# Patient Record
Sex: FEMALE | Race: WHITE
[De-identification: names, ages, dates, MRNs, and addresses within clinical notes are randomized per-mention and may not be internally consistent; named-entity substitution may affect disease eponyms.]

---

## 2019-07-30 ENCOUNTER — HOSPITAL ENCOUNTER (OUTPATIENT)
Dept: HOSPITAL 95 - LAB SHORT | Age: 69
Discharge: HOME | End: 2019-07-30
Attending: OBSTETRICS & GYNECOLOGY
Payer: COMMERCIAL

## 2019-07-30 DIAGNOSIS — R30.0: Primary | ICD-10-CM

## 2019-08-02 ENCOUNTER — HOSPITAL ENCOUNTER (EMERGENCY)
Dept: HOSPITAL 95 - ER | Age: 69
Discharge: HOME | End: 2019-08-02
Payer: COMMERCIAL

## 2019-08-02 VITALS — WEIGHT: 125 LBS | HEIGHT: 63 IN | BODY MASS INDEX: 22.15 KG/M2
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
## 2019-08-02 DIAGNOSIS — Z87.891: ICD-10-CM

## 2019-08-02 DIAGNOSIS — J45.909: ICD-10-CM

## 2019-08-02 DIAGNOSIS — Z88.1: ICD-10-CM

## 2019-08-02 DIAGNOSIS — Z79.899: ICD-10-CM

## 2019-08-02 DIAGNOSIS — S76.011A: Primary | ICD-10-CM

## 2019-08-02 DIAGNOSIS — Z88.2: ICD-10-CM

## 2019-08-04 ENCOUNTER — HOSPITAL ENCOUNTER (EMERGENCY)
Dept: HOSPITAL 95 - ER | Age: 69
Discharge: HOME | End: 2019-08-04
Payer: COMMERCIAL

## 2019-08-04 VITALS — BODY MASS INDEX: 21.34 KG/M2 | HEIGHT: 64 IN | WEIGHT: 125 LBS

## 2019-08-04 DIAGNOSIS — Z88.2: ICD-10-CM

## 2019-08-04 DIAGNOSIS — Z79.891: ICD-10-CM

## 2019-08-04 DIAGNOSIS — Z88.1: ICD-10-CM

## 2019-08-04 DIAGNOSIS — Z79.899: ICD-10-CM

## 2019-08-04 DIAGNOSIS — Z87.891: ICD-10-CM

## 2019-08-04 DIAGNOSIS — Z79.52: ICD-10-CM

## 2019-08-04 DIAGNOSIS — M46.1: Primary | ICD-10-CM

## 2019-08-04 DIAGNOSIS — J45.909: ICD-10-CM

## 2019-09-19 ENCOUNTER — HOSPITAL ENCOUNTER (OUTPATIENT)
Dept: HOSPITAL 95 - LAB | Age: 69
Discharge: HOME | End: 2019-09-19
Attending: INTERNAL MEDICINE
Payer: COMMERCIAL

## 2019-09-19 DIAGNOSIS — Z51.81: Primary | ICD-10-CM

## 2019-09-19 DIAGNOSIS — Z79.891: ICD-10-CM

## 2020-05-03 ENCOUNTER — HOSPITAL ENCOUNTER (EMERGENCY)
Dept: HOSPITAL 95 - ER | Age: 70
Discharge: ANOTHER HOSPITAL | End: 2020-05-03
Payer: SELF-PAY

## 2020-05-03 VITALS — HEIGHT: 64 IN | BODY MASS INDEX: 23.9 KG/M2 | WEIGHT: 139.99 LBS

## 2020-05-03 DIAGNOSIS — F41.9: ICD-10-CM

## 2020-05-03 DIAGNOSIS — S22.43XA: ICD-10-CM

## 2020-05-03 DIAGNOSIS — S27.1XXA: ICD-10-CM

## 2020-05-03 DIAGNOSIS — S82.401B: ICD-10-CM

## 2020-05-03 DIAGNOSIS — S42.401A: ICD-10-CM
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
## 2020-05-03 DIAGNOSIS — S81.812A: ICD-10-CM

## 2020-05-03 DIAGNOSIS — S30.1XXA: ICD-10-CM

## 2020-05-03 DIAGNOSIS — S02.32XA: ICD-10-CM

## 2020-05-03 DIAGNOSIS — R06.02: ICD-10-CM

## 2020-05-03 DIAGNOSIS — J44.9: ICD-10-CM

## 2020-05-03 DIAGNOSIS — S02.40DA: ICD-10-CM

## 2020-05-03 DIAGNOSIS — Z87.891: ICD-10-CM

## 2020-05-03 DIAGNOSIS — S82.841A: ICD-10-CM

## 2020-05-03 DIAGNOSIS — S31.113A: ICD-10-CM

## 2020-05-03 DIAGNOSIS — S02.5XXA: ICD-10-CM

## 2020-05-03 DIAGNOSIS — S31.114A: ICD-10-CM

## 2020-05-03 DIAGNOSIS — S02.2XXA: ICD-10-CM

## 2020-05-03 DIAGNOSIS — S82.201B: Primary | ICD-10-CM

## 2020-05-03 LAB
ALBUMIN SERPL BCP-MCNC: 2.8 G/DL (ref 3.4–5)
ALBUMIN/GLOB SERPL: 0.8 {RATIO} (ref 0.8–1.8)
ALT SERPL W P-5'-P-CCNC: 319 U/L (ref 12–78)
ANION GAP SERPL CALCULATED.4IONS-SCNC: 7 MMOL/L (ref 6–16)
AST SERPL W P-5'-P-CCNC: 562 U/L (ref 12–37)
BASOPHILS # BLD AUTO: 0.16 K/MM3 (ref 0–0.23)
BASOPHILS NFR BLD AUTO: 2 % (ref 0–2)
BILIRUB SERPL-MCNC: 0.4 MG/DL (ref 0.1–1)
BUN SERPL-MCNC: 31 MG/DL (ref 8–24)
CA-I BLD-SCNC: 1.22 MMOL/L (ref 1.1–1.46)
CALCIUM SERPL-MCNC: 8.4 MG/DL (ref 8.5–10.1)
CHLORIDE BLD-SCNC: 109 MMOL/L (ref 98–108)
CHLORIDE SERPL-SCNC: 111 MMOL/L (ref 98–108)
CO2 BLD-SCNC: 23 MMOL/L (ref 21–32)
CO2 SERPL-SCNC: 22 MMOL/L (ref 21–32)
CREAT BLD-MCNC: 1 MG/DL (ref 0.6–1)
CREAT SERPL-MCNC: 0.85 MG/DL (ref 0.4–1)
DEPRECATED RDW RBC AUTO: 48.5 FL (ref 35.1–46.3)
EOSINOPHIL # BLD AUTO: 0.46 K/MM3 (ref 0–0.68)
EOSINOPHIL NFR BLD AUTO: 5 % (ref 0–6)
ERYTHROCYTE [DISTWIDTH] IN BLOOD BY AUTOMATED COUNT: 13.9 % (ref 11.7–14.2)
ETHANOL SERPL-MCNC: <3 MG/DL
GLOBULIN SER CALC-MCNC: 3.6 G/DL (ref 2.2–4)
GLUCOSE BLDC GLUCOMTR-MCNC: 155 MG/DL (ref 70–99)
GLUCOSE SERPL-MCNC: 157 MG/DL (ref 70–99)
HCT VFR BLD AUTO: 34.9 % (ref 33–51)
HCT VFR BLD CALC: 33 % (ref 36–46)
HGB BLD CALC-MCNC: 11.2 G/DL (ref 12–16)
HGB BLD-MCNC: 11.6 G/DL (ref 11.5–16)
IMM GRANULOCYTES # BLD AUTO: 0.13 K/MM3 (ref 0–0.1)
IMM GRANULOCYTES NFR BLD AUTO: 1 % (ref 0–1)
KETONES UR STRIP-MCNC: (no result) MG/DL
LYMPHOCYTES # BLD AUTO: 3.43 K/MM3 (ref 0.84–5.2)
LYMPHOCYTES NFR BLD AUTO: 37 % (ref 21–46)
MCHC RBC AUTO-ENTMCNC: 33.2 G/DL (ref 31.5–36.5)
MCV RBC AUTO: 95 FL (ref 80–100)
MONOCYTES # BLD AUTO: 1.05 K/MM3 (ref 0.16–1.47)
MONOCYTES NFR BLD AUTO: 11 % (ref 4–13)
NEUTROPHILS # BLD AUTO: 4.18 K/MM3 (ref 1.96–9.15)
NEUTROPHILS NFR BLD AUTO: 44 % (ref 41–73)
NRBC # BLD AUTO: 0 K/MM3 (ref 0–0.02)
NRBC BLD AUTO-RTO: 0 /100 WBC (ref 0–0.2)
OPIATES UR-MCNC: DETECTED NG/ML
PH BLDA: 7.4 [PH] (ref 7.35–7.45)
PLATELET # BLD AUTO: 456 K/MM3 (ref 150–400)
POTASSIUM BLD-SCNC: 4.1 MMOL/L (ref 3.5–5.5)
POTASSIUM SERPL-SCNC: 4.1 MMOL/L (ref 3.5–5.5)
PROT SERPL-MCNC: 6.4 G/DL (ref 6.4–8.2)
PROT UR STRIP-MCNC: (no result) MG/DL
PROTHROMBIN TIME: 10.7 SEC (ref 9.7–11.5)
RBC #/AREA URNS HPF: (no result) /HPF (ref 0–2)
SODIUM BLD-SCNC: 138 MMOL/L (ref 135–148)
SODIUM SERPL-SCNC: 140 MMOL/L (ref 136–145)
SP GR SPEC: 1.02 (ref 1–1.02)
UROBILINOGEN UR STRIP-MCNC: (no result) MG/DL
WBC #/AREA URNS HPF: (no result) /HPF (ref 0–5)

## 2020-05-03 PROCEDURE — G0480 DRUG TEST DEF 1-7 CLASSES: HCPCS

## 2020-08-14 ENCOUNTER — HOSPITAL ENCOUNTER (OUTPATIENT)
Dept: HOSPITAL 95 - WOUND | Age: 70
Discharge: HOME | End: 2020-08-14
Attending: NURSE PRACTITIONER
Payer: COMMERCIAL

## 2020-08-14 DIAGNOSIS — L97.812: ICD-10-CM

## 2020-08-14 DIAGNOSIS — L97.822: Primary | ICD-10-CM

## 2020-08-14 DIAGNOSIS — J45.909: ICD-10-CM

## 2020-08-14 DIAGNOSIS — Z79.899: ICD-10-CM

## 2020-08-14 DIAGNOSIS — I10: ICD-10-CM

## 2020-08-14 DIAGNOSIS — F41.9: ICD-10-CM

## 2020-08-14 DIAGNOSIS — L98.492: ICD-10-CM

## 2020-09-02 ENCOUNTER — HOSPITAL ENCOUNTER (OUTPATIENT)
Dept: HOSPITAL 95 - WOUND | Age: 70
Discharge: HOME | End: 2020-09-02
Attending: NURSE PRACTITIONER
Payer: COMMERCIAL

## 2020-09-02 DIAGNOSIS — J45.909: ICD-10-CM

## 2020-09-02 DIAGNOSIS — L97.822: Primary | ICD-10-CM

## 2020-09-02 DIAGNOSIS — I10: ICD-10-CM

## 2020-09-02 DIAGNOSIS — L98.492: ICD-10-CM

## 2020-09-02 DIAGNOSIS — Z79.899: ICD-10-CM

## 2020-09-02 DIAGNOSIS — L97.812: ICD-10-CM

## 2020-09-24 ENCOUNTER — HOSPITAL ENCOUNTER (OUTPATIENT)
Dept: HOSPITAL 95 - WOUND | Age: 70
Discharge: HOME | End: 2020-09-24
Attending: SURGERY
Payer: COMMERCIAL

## 2020-09-24 DIAGNOSIS — L97.819: ICD-10-CM

## 2020-09-24 DIAGNOSIS — L98.499: ICD-10-CM

## 2020-09-24 DIAGNOSIS — L97.829: Primary | ICD-10-CM

## 2020-09-24 PROCEDURE — G0463 HOSPITAL OUTPT CLINIC VISIT: HCPCS

## 2020-09-30 ENCOUNTER — HOSPITAL ENCOUNTER (OUTPATIENT)
Dept: HOSPITAL 95 - WOUND | Age: 70
Discharge: HOME | End: 2020-09-30
Attending: SURGERY
Payer: COMMERCIAL

## 2020-09-30 DIAGNOSIS — T81.30XA: Primary | ICD-10-CM
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
## 2020-09-30 DIAGNOSIS — M19.90: ICD-10-CM

## 2020-09-30 DIAGNOSIS — F41.9: ICD-10-CM
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
## 2020-09-30 DIAGNOSIS — F32.9: ICD-10-CM

## 2020-09-30 DIAGNOSIS — S81.801A: ICD-10-CM

## 2020-09-30 DIAGNOSIS — J44.9: ICD-10-CM

## 2020-09-30 DIAGNOSIS — I96: ICD-10-CM

## 2020-09-30 DIAGNOSIS — Z79.899: ICD-10-CM

## 2020-09-30 DIAGNOSIS — Z79.2: ICD-10-CM

## 2020-09-30 DIAGNOSIS — I10: ICD-10-CM

## 2020-09-30 DIAGNOSIS — Z87.891: ICD-10-CM

## 2020-09-30 DIAGNOSIS — Z79.51: ICD-10-CM

## 2020-09-30 DIAGNOSIS — Z93.0: ICD-10-CM

## 2020-09-30 PROCEDURE — G0463 HOSPITAL OUTPT CLINIC VISIT: HCPCS

## 2020-10-07 ENCOUNTER — HOSPITAL ENCOUNTER (OUTPATIENT)
Dept: HOSPITAL 95 - WOUND | Age: 70
Discharge: HOME | End: 2020-10-07
Attending: SURGERY
Payer: COMMERCIAL

## 2020-10-07 DIAGNOSIS — L98.499: ICD-10-CM

## 2020-10-07 DIAGNOSIS — L97.829: Primary | ICD-10-CM

## 2020-10-07 DIAGNOSIS — L97.819: ICD-10-CM

## 2020-10-07 PROCEDURE — G0463 HOSPITAL OUTPT CLINIC VISIT: HCPCS

## 2020-10-14 ENCOUNTER — HOSPITAL ENCOUNTER (OUTPATIENT)
Dept: HOSPITAL 95 - WOUND | Age: 70
Discharge: HOME | End: 2020-10-14
Attending: SURGERY
Payer: COMMERCIAL

## 2020-10-14 DIAGNOSIS — T81.30XA: Primary | ICD-10-CM

## 2020-10-14 DIAGNOSIS — Z88.2: ICD-10-CM

## 2020-10-14 DIAGNOSIS — Z79.51: ICD-10-CM

## 2020-10-14 DIAGNOSIS — M19.90: ICD-10-CM

## 2020-10-14 DIAGNOSIS — Z88.1: ICD-10-CM

## 2020-10-14 DIAGNOSIS — I96: ICD-10-CM

## 2020-10-14 DIAGNOSIS — J44.9: ICD-10-CM

## 2020-10-14 DIAGNOSIS — Z79.899: ICD-10-CM
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
## 2020-10-14 DIAGNOSIS — I10: ICD-10-CM

## 2020-10-14 PROCEDURE — G0463 HOSPITAL OUTPT CLINIC VISIT: HCPCS

## 2020-10-21 ENCOUNTER — HOSPITAL ENCOUNTER (OUTPATIENT)
Dept: HOSPITAL 95 - WOUND | Age: 70
Discharge: HOME | End: 2020-10-21
Attending: NURSE PRACTITIONER
Payer: COMMERCIAL

## 2020-10-21 DIAGNOSIS — L98.499: ICD-10-CM

## 2020-10-21 DIAGNOSIS — J45.909: ICD-10-CM

## 2020-10-21 DIAGNOSIS — F41.9: ICD-10-CM

## 2020-10-21 DIAGNOSIS — L97.829: ICD-10-CM

## 2020-10-21 DIAGNOSIS — I10: ICD-10-CM

## 2020-10-21 DIAGNOSIS — L97.819: Primary | ICD-10-CM

## 2020-10-21 DIAGNOSIS — Z79.899: ICD-10-CM

## 2020-10-21 PROCEDURE — G0463 HOSPITAL OUTPT CLINIC VISIT: HCPCS

## 2020-11-04 ENCOUNTER — HOSPITAL ENCOUNTER (OUTPATIENT)
Dept: HOSPITAL 95 - WOUND | Age: 70
Discharge: HOME | End: 2020-11-04
Attending: NURSE PRACTITIONER
Payer: COMMERCIAL

## 2020-11-04 DIAGNOSIS — T81.31XA: Primary | ICD-10-CM

## 2020-11-04 DIAGNOSIS — Z79.52: ICD-10-CM

## 2020-11-04 DIAGNOSIS — Z87.891: ICD-10-CM

## 2020-11-04 DIAGNOSIS — Z79.899: ICD-10-CM

## 2020-11-04 DIAGNOSIS — E78.5: ICD-10-CM

## 2020-11-04 DIAGNOSIS — J44.9: ICD-10-CM

## 2020-11-04 DIAGNOSIS — M81.0: ICD-10-CM
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
## 2020-11-04 DIAGNOSIS — I10: ICD-10-CM

## 2020-11-04 DIAGNOSIS — Z88.1: ICD-10-CM

## 2020-11-04 DIAGNOSIS — I96: ICD-10-CM

## 2020-11-04 DIAGNOSIS — Z79.51: ICD-10-CM

## 2020-11-04 DIAGNOSIS — S81.009D: ICD-10-CM

## 2020-11-04 DIAGNOSIS — F41.8: ICD-10-CM

## 2020-11-04 DIAGNOSIS — M19.90: ICD-10-CM
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
## 2020-11-04 DIAGNOSIS — Z88.2: ICD-10-CM

## 2020-11-11 ENCOUNTER — HOSPITAL ENCOUNTER (OUTPATIENT)
Dept: HOSPITAL 95 - WOUND | Age: 70
Discharge: HOME | End: 2020-11-11
Attending: NURSE PRACTITIONER
Payer: COMMERCIAL

## 2020-11-11 DIAGNOSIS — I10: ICD-10-CM

## 2020-11-11 DIAGNOSIS — F41.9: ICD-10-CM

## 2020-11-11 DIAGNOSIS — J45.909: ICD-10-CM

## 2020-11-11 DIAGNOSIS — T81.31XA: Primary | ICD-10-CM

## 2020-11-11 DIAGNOSIS — Z79.899: ICD-10-CM

## 2020-11-18 ENCOUNTER — HOSPITAL ENCOUNTER (OUTPATIENT)
Dept: HOSPITAL 95 - WOUND | Age: 70
Discharge: HOME | End: 2020-11-18
Attending: NURSE PRACTITIONER
Payer: COMMERCIAL

## 2020-11-18 DIAGNOSIS — T81.31XD: ICD-10-CM
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
## 2020-11-18 DIAGNOSIS — Z79.51: ICD-10-CM

## 2020-11-18 DIAGNOSIS — Z88.1: ICD-10-CM

## 2020-11-18 DIAGNOSIS — J44.9: ICD-10-CM

## 2020-11-18 DIAGNOSIS — T81.31XA: Primary | ICD-10-CM

## 2020-11-18 DIAGNOSIS — I10: ICD-10-CM

## 2020-11-18 DIAGNOSIS — F41.9: ICD-10-CM

## 2020-11-18 DIAGNOSIS — Z79.899: ICD-10-CM

## 2020-11-18 DIAGNOSIS — I96: ICD-10-CM

## 2020-11-18 DIAGNOSIS — M19.90: ICD-10-CM

## 2020-11-18 DIAGNOSIS — M81.0: ICD-10-CM

## 2020-11-24 ENCOUNTER — HOSPITAL ENCOUNTER (OUTPATIENT)
Dept: HOSPITAL 95 - WOUND | Age: 70
Discharge: HOME | End: 2020-11-24
Attending: SURGERY
Payer: COMMERCIAL
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
## 2020-11-24 DIAGNOSIS — F41.9: ICD-10-CM

## 2020-11-24 DIAGNOSIS — I10: ICD-10-CM

## 2020-11-24 DIAGNOSIS — J44.9: ICD-10-CM

## 2020-11-24 DIAGNOSIS — Z79.899: ICD-10-CM

## 2020-11-24 DIAGNOSIS — Z88.2: ICD-10-CM

## 2020-11-24 DIAGNOSIS — J45.909: ICD-10-CM

## 2020-11-24 DIAGNOSIS — T81.31XA: Primary | ICD-10-CM

## 2020-11-24 DIAGNOSIS — M81.0: ICD-10-CM

## 2020-11-24 DIAGNOSIS — L97.212: ICD-10-CM

## 2020-11-24 DIAGNOSIS — Z88.1: ICD-10-CM

## 2020-11-24 DIAGNOSIS — I96: ICD-10-CM

## 2020-12-01 ENCOUNTER — HOSPITAL ENCOUNTER (OUTPATIENT)
Dept: HOSPITAL 95 - WOUND | Age: 70
Discharge: HOME | End: 2020-12-01
Attending: SURGERY
Payer: COMMERCIAL
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
## 2020-12-01 DIAGNOSIS — I10: ICD-10-CM

## 2020-12-01 DIAGNOSIS — Z88.1: ICD-10-CM

## 2020-12-01 DIAGNOSIS — T81.31XD: Primary | ICD-10-CM

## 2020-12-01 DIAGNOSIS — I96: ICD-10-CM

## 2020-12-01 DIAGNOSIS — Z79.899: ICD-10-CM

## 2020-12-01 DIAGNOSIS — Z79.51: ICD-10-CM

## 2020-12-01 DIAGNOSIS — Z88.2: ICD-10-CM

## 2020-12-01 DIAGNOSIS — J44.9: ICD-10-CM

## 2020-12-01 DIAGNOSIS — M19.90: ICD-10-CM

## 2020-12-01 PROCEDURE — G0463 HOSPITAL OUTPT CLINIC VISIT: HCPCS

## 2020-12-10 ENCOUNTER — HOSPITAL ENCOUNTER (OUTPATIENT)
Dept: HOSPITAL 95 - WOUND | Age: 70
Discharge: HOME | End: 2020-12-10
Attending: SURGERY
Payer: COMMERCIAL

## 2020-12-10 DIAGNOSIS — Z79.899: ICD-10-CM

## 2020-12-10 DIAGNOSIS — I10: ICD-10-CM

## 2020-12-10 DIAGNOSIS — L97.316: ICD-10-CM

## 2020-12-10 DIAGNOSIS — F41.9: ICD-10-CM

## 2020-12-10 DIAGNOSIS — Z88.1: ICD-10-CM
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
## 2020-12-10 DIAGNOSIS — I96: ICD-10-CM

## 2020-12-10 DIAGNOSIS — Z79.51: ICD-10-CM

## 2020-12-10 DIAGNOSIS — Z88.2: ICD-10-CM

## 2020-12-10 DIAGNOSIS — J44.9: ICD-10-CM
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
## 2020-12-10 DIAGNOSIS — S81.801D: ICD-10-CM

## 2020-12-10 DIAGNOSIS — M19.90: ICD-10-CM

## 2020-12-10 DIAGNOSIS — M81.0: ICD-10-CM

## 2020-12-10 DIAGNOSIS — T81.31XD: Primary | ICD-10-CM

## 2020-12-10 PROCEDURE — G0463 HOSPITAL OUTPT CLINIC VISIT: HCPCS

## 2020-12-17 ENCOUNTER — HOSPITAL ENCOUNTER (OUTPATIENT)
Dept: HOSPITAL 95 - WOUND | Age: 70
Discharge: HOME | End: 2020-12-17
Attending: SURGERY
Payer: COMMERCIAL

## 2020-12-17 DIAGNOSIS — L97.212: ICD-10-CM

## 2020-12-17 DIAGNOSIS — Z79.899: ICD-10-CM

## 2020-12-17 DIAGNOSIS — S81.811D: ICD-10-CM
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
## 2020-12-17 DIAGNOSIS — J44.9: ICD-10-CM

## 2020-12-17 DIAGNOSIS — L97.312: ICD-10-CM

## 2020-12-17 DIAGNOSIS — Z88.1: ICD-10-CM

## 2020-12-17 DIAGNOSIS — F41.9: ICD-10-CM

## 2020-12-17 DIAGNOSIS — Z79.51: ICD-10-CM

## 2020-12-17 DIAGNOSIS — Z88.2: ICD-10-CM

## 2020-12-17 DIAGNOSIS — I96: ICD-10-CM
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
## 2020-12-17 DIAGNOSIS — Z79.1: ICD-10-CM

## 2020-12-17 DIAGNOSIS — M81.0: ICD-10-CM

## 2020-12-17 DIAGNOSIS — M19.90: ICD-10-CM

## 2020-12-17 DIAGNOSIS — I10: ICD-10-CM

## 2020-12-17 DIAGNOSIS — T81.31XA: Primary | ICD-10-CM

## 2020-12-23 ENCOUNTER — HOSPITAL ENCOUNTER (OUTPATIENT)
Dept: HOSPITAL 95 - WOUND | Age: 70
Discharge: HOME | End: 2020-12-23
Attending: SURGERY
Payer: COMMERCIAL

## 2020-12-23 DIAGNOSIS — I96: ICD-10-CM

## 2020-12-23 DIAGNOSIS — L97.812: ICD-10-CM

## 2020-12-23 DIAGNOSIS — I10: ICD-10-CM

## 2020-12-23 DIAGNOSIS — Z88.2: ICD-10-CM

## 2020-12-23 DIAGNOSIS — Z79.899: ICD-10-CM

## 2020-12-23 DIAGNOSIS — M81.0: ICD-10-CM

## 2020-12-23 DIAGNOSIS — F41.9: ICD-10-CM

## 2020-12-23 DIAGNOSIS — Z79.51: ICD-10-CM

## 2020-12-23 DIAGNOSIS — J44.9: ICD-10-CM

## 2020-12-23 DIAGNOSIS — S81.811D: ICD-10-CM

## 2020-12-23 DIAGNOSIS — Z88.1: ICD-10-CM
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
## 2020-12-23 DIAGNOSIS — M19.90: ICD-10-CM

## 2020-12-23 DIAGNOSIS — T81.31XA: Primary | ICD-10-CM
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
## 2021-01-06 ENCOUNTER — HOSPITAL ENCOUNTER (OUTPATIENT)
Dept: HOSPITAL 95 - WOUND | Age: 71
Discharge: HOME | End: 2021-01-06
Payer: COMMERCIAL

## 2021-01-06 DIAGNOSIS — I10: ICD-10-CM

## 2021-01-06 DIAGNOSIS — L97.212: ICD-10-CM
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
## 2021-01-06 DIAGNOSIS — M81.0: ICD-10-CM

## 2021-01-06 DIAGNOSIS — T81.31XD: Primary | ICD-10-CM

## 2021-01-06 DIAGNOSIS — F41.9: ICD-10-CM

## 2021-01-06 DIAGNOSIS — J45.909: ICD-10-CM

## 2021-01-06 PROCEDURE — A9270 NON-COVERED ITEM OR SERVICE: HCPCS

## 2021-01-06 PROCEDURE — G0463 HOSPITAL OUTPT CLINIC VISIT: HCPCS

## 2021-01-12 ENCOUNTER — HOSPITAL ENCOUNTER (OUTPATIENT)
Dept: HOSPITAL 95 - WOUND | Age: 71
Discharge: HOME | End: 2021-01-12
Payer: COMMERCIAL

## 2021-01-12 DIAGNOSIS — F41.9: ICD-10-CM

## 2021-01-12 DIAGNOSIS — I10: ICD-10-CM

## 2021-01-12 DIAGNOSIS — L97.212: ICD-10-CM

## 2021-01-12 DIAGNOSIS — J45.909: ICD-10-CM

## 2021-01-12 DIAGNOSIS — T81.31XD: Primary | ICD-10-CM

## 2021-01-12 DIAGNOSIS — M81.0: ICD-10-CM

## 2021-01-12 PROCEDURE — A9270 NON-COVERED ITEM OR SERVICE: HCPCS

## 2021-01-19 ENCOUNTER — HOSPITAL ENCOUNTER (OUTPATIENT)
Dept: HOSPITAL 95 - WOUND | Age: 71
Discharge: HOME | End: 2021-01-19
Attending: NURSE PRACTITIONER
Payer: COMMERCIAL

## 2021-01-19 DIAGNOSIS — I10: ICD-10-CM

## 2021-01-19 DIAGNOSIS — F41.9: ICD-10-CM

## 2021-01-19 DIAGNOSIS — Z79.899: ICD-10-CM

## 2021-01-19 DIAGNOSIS — Z88.2: ICD-10-CM

## 2021-01-19 DIAGNOSIS — M81.0: ICD-10-CM

## 2021-01-19 DIAGNOSIS — Z88.1: ICD-10-CM

## 2021-01-19 DIAGNOSIS — L97.216: Primary | ICD-10-CM

## 2021-01-19 PROCEDURE — A9270 NON-COVERED ITEM OR SERVICE: HCPCS

## 2021-01-19 PROCEDURE — G0463 HOSPITAL OUTPT CLINIC VISIT: HCPCS

## 2021-01-26 ENCOUNTER — HOSPITAL ENCOUNTER (OUTPATIENT)
Dept: HOSPITAL 95 - WOUND | Age: 71
Discharge: HOME | End: 2021-01-26
Payer: COMMERCIAL

## 2021-01-26 DIAGNOSIS — F41.9: ICD-10-CM

## 2021-01-26 DIAGNOSIS — I10: ICD-10-CM

## 2021-01-26 DIAGNOSIS — L97.216: Primary | ICD-10-CM

## 2021-01-26 DIAGNOSIS — M81.0: ICD-10-CM
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
## 2021-01-26 DIAGNOSIS — J45.909: ICD-10-CM

## 2021-01-26 DIAGNOSIS — T81.31XD: ICD-10-CM

## 2021-01-26 PROCEDURE — A9270 NON-COVERED ITEM OR SERVICE: HCPCS

## 2021-02-12 ENCOUNTER — HOSPITAL ENCOUNTER (OUTPATIENT)
Dept: HOSPITAL 95 - WOUND | Age: 71
Discharge: HOME | End: 2021-02-12
Payer: COMMERCIAL

## 2021-02-12 DIAGNOSIS — M81.0: ICD-10-CM

## 2021-02-12 DIAGNOSIS — Z87.81: ICD-10-CM

## 2021-02-12 DIAGNOSIS — J45.909: ICD-10-CM

## 2021-02-12 DIAGNOSIS — T81.31XD: Primary | ICD-10-CM

## 2021-02-12 DIAGNOSIS — I10: ICD-10-CM
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
## 2021-02-12 DIAGNOSIS — F41.9: ICD-10-CM

## 2021-02-12 PROCEDURE — A9270 NON-COVERED ITEM OR SERVICE: HCPCS

## 2021-02-12 PROCEDURE — G0463 HOSPITAL OUTPT CLINIC VISIT: HCPCS

## 2021-03-03 ENCOUNTER — HOSPITAL ENCOUNTER (OUTPATIENT)
Dept: HOSPITAL 95 - WOUND | Age: 71
Discharge: HOME | End: 2021-03-03
Attending: NURSE PRACTITIONER
Payer: COMMERCIAL

## 2021-03-03 DIAGNOSIS — J45.909: ICD-10-CM

## 2021-03-03 DIAGNOSIS — I10: ICD-10-CM

## 2021-03-03 DIAGNOSIS — T81.31XA: Primary | ICD-10-CM

## 2021-03-03 PROCEDURE — A9270 NON-COVERED ITEM OR SERVICE: HCPCS

## 2021-12-16 ENCOUNTER — HOSPITAL ENCOUNTER (OUTPATIENT)
Dept: HOSPITAL 95 - ORSCSDS | Age: 71
Discharge: HOME | End: 2021-12-16
Attending: OPHTHALMOLOGY
Payer: COMMERCIAL

## 2021-12-16 VITALS — HEIGHT: 64 IN | WEIGHT: 123.9 LBS | BODY MASS INDEX: 21.15 KG/M2

## 2021-12-16 DIAGNOSIS — J44.9: ICD-10-CM

## 2021-12-16 DIAGNOSIS — Z79.899: ICD-10-CM

## 2021-12-16 DIAGNOSIS — H25.12: Primary | ICD-10-CM

## 2021-12-16 DIAGNOSIS — I48.91: ICD-10-CM

## 2021-12-16 DIAGNOSIS — I10: ICD-10-CM

## 2021-12-16 DIAGNOSIS — J45.909: ICD-10-CM

## 2021-12-16 DIAGNOSIS — E78.5: ICD-10-CM

## 2021-12-16 DIAGNOSIS — Z87.891: ICD-10-CM

## 2021-12-16 PROCEDURE — 08RK3JZ REPLACEMENT OF LEFT LENS WITH SYNTHETIC SUBSTITUTE, PERCUTANEOUS APPROACH: ICD-10-PCS | Performed by: OPHTHALMOLOGY

## 2021-12-16 PROCEDURE — V2632 POST CHMBR INTRAOCULAR LENS: HCPCS

## 2021-12-16 NOTE — NUR
12/16/21 [address] NOTIFIED AT PT BEDSIDE OF ELEVATE BP TOLD PT THEY WOULD
DISCUSS AT F/U VISIT TOMORROW AND SHE NEEDS TO F/U UP WITH PCP FOR
HYPERTENTION. SEE VITAL STRIP

## 2022-01-06 ENCOUNTER — HOSPITAL ENCOUNTER (OUTPATIENT)
Dept: HOSPITAL 95 - ORSCSDS | Age: 72
Discharge: HOME | End: 2022-01-06
Attending: OPHTHALMOLOGY
Payer: COMMERCIAL

## 2022-01-06 VITALS — WEIGHT: 125.44 LBS | HEIGHT: 64 IN | BODY MASS INDEX: 21.42 KG/M2

## 2022-01-06 DIAGNOSIS — Z79.899: ICD-10-CM

## 2022-01-06 DIAGNOSIS — J44.9: ICD-10-CM

## 2022-01-06 DIAGNOSIS — I10: ICD-10-CM

## 2022-01-06 DIAGNOSIS — H25.11: Primary | ICD-10-CM

## 2022-01-06 DIAGNOSIS — I48.91: ICD-10-CM

## 2022-01-06 PROCEDURE — V2632 POST CHMBR INTRAOCULAR LENS: HCPCS

## 2022-01-06 PROCEDURE — 08RJ3JZ REPLACEMENT OF RIGHT LENS WITH SYNTHETIC SUBSTITUTE, PERCUTANEOUS APPROACH: ICD-10-PCS | Performed by: OPHTHALMOLOGY

## 2022-01-06 NOTE — NUR
01/06/22 1004 NIKKI CARNEY
PT WAITING FOR RIDE IN STEP DOWN RECLINER- STEADY ON FEET ALERT AND
ORIENTED AND READY ALEISHA DISCHARGE. VITALS ARE STABLE.

## 2022-01-06 NOTE — NUR
01/06/22 0828 Ifeoma Mcconnell ADMIN GTT @0815 [facility] [place] @ 0817 [facility] [place] NOTE

## 2022-01-07 NOTE — NUR
01/07/22 1334 Alicia Knox
MOXIFLOXACIN 1.5MG AND 500CC BSS (PER ORDERS) ADMINISTERED PER ORDERS
BUT NOT CARRIED OVER BY PHARMACY IN [facility].

## 2023-04-10 ENCOUNTER — HOSPITAL ENCOUNTER (OUTPATIENT)
Dept: HOSPITAL 95 - LAB | Age: 73
Discharge: HOME | End: 2023-04-10
Attending: FAMILY MEDICINE
Payer: COMMERCIAL

## 2023-04-10 DIAGNOSIS — N39.0: Primary | ICD-10-CM

## 2024-10-02 ENCOUNTER — HOSPITAL ENCOUNTER (OUTPATIENT)
Dept: HOSPITAL 95 - LAB SHORT | Age: 74
Discharge: HOME | End: 2024-10-02
Attending: INTERNAL MEDICINE
Payer: COMMERCIAL

## 2024-10-02 DIAGNOSIS — R74.01: Primary | ICD-10-CM

## 2024-10-02 PROCEDURE — G0328 FECAL BLOOD SCRN IMMUNOASSAY: HCPCS
